# Patient Record
Sex: FEMALE | Race: ASIAN | Employment: STUDENT | URBAN - METROPOLITAN AREA
[De-identification: names, ages, dates, MRNs, and addresses within clinical notes are randomized per-mention and may not be internally consistent; named-entity substitution may affect disease eponyms.]

---
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
## 2019-01-25 ENCOUNTER — MED REC SCAN ONLY (OUTPATIENT)
Dept: FAMILY MEDICINE CLINIC | Facility: CLINIC | Age: 19
End: 2019-01-25

## 2019-01-25 ENCOUNTER — OFFICE VISIT (OUTPATIENT)
Dept: FAMILY MEDICINE CLINIC | Facility: CLINIC | Age: 19
End: 2019-01-25
Payer: COMMERCIAL

## 2019-01-25 VITALS
BODY MASS INDEX: 26.26 KG/M2 | RESPIRATION RATE: 17 BRPM | DIASTOLIC BLOOD PRESSURE: 80 MMHG | SYSTOLIC BLOOD PRESSURE: 104 MMHG | HEART RATE: 74 BPM | WEIGHT: 132 LBS | OXYGEN SATURATION: 98 % | HEIGHT: 59.5 IN | TEMPERATURE: 98 F

## 2019-01-25 DIAGNOSIS — Z23 NEED FOR VARICELLA VACCINE: ICD-10-CM

## 2019-01-25 DIAGNOSIS — Z00.00 ENCOUNTER FOR ANNUAL PHYSICAL EXAMINATION EXCLUDING GYNECOLOGICAL EXAMINATION IN A PATIENT OLDER THAN 17 YEARS: Primary | ICD-10-CM

## 2019-01-25 DIAGNOSIS — Z23 NEED FOR IMMUNIZATION AGAINST VIRAL HEPATITIS: ICD-10-CM

## 2019-01-25 DIAGNOSIS — Z13.0 SCREENING FOR ENDOCRINE, NUTRITIONAL, METABOLIC AND IMMUNITY DISORDER: ICD-10-CM

## 2019-01-25 DIAGNOSIS — Z13.29 SCREENING FOR ENDOCRINE, NUTRITIONAL, METABOLIC AND IMMUNITY DISORDER: ICD-10-CM

## 2019-01-25 DIAGNOSIS — Z13.21 SCREENING FOR ENDOCRINE, NUTRITIONAL, METABOLIC AND IMMUNITY DISORDER: ICD-10-CM

## 2019-01-25 DIAGNOSIS — Z13.228 SCREENING FOR ENDOCRINE, NUTRITIONAL, METABOLIC AND IMMUNITY DISORDER: ICD-10-CM

## 2019-01-25 DIAGNOSIS — Z23 NEED FOR MEASLES-MUMPS-RUBELLA (MMR) VACCINE: ICD-10-CM

## 2019-01-25 PROCEDURE — 90707 MMR VACCINE SC: CPT | Performed by: EMERGENCY MEDICINE

## 2019-01-25 PROCEDURE — 90716 VAR VACCINE LIVE SUBQ: CPT | Performed by: EMERGENCY MEDICINE

## 2019-01-25 PROCEDURE — 90472 IMMUNIZATION ADMIN EACH ADD: CPT | Performed by: EMERGENCY MEDICINE

## 2019-01-25 PROCEDURE — 90471 IMMUNIZATION ADMIN: CPT | Performed by: EMERGENCY MEDICINE

## 2019-01-25 PROCEDURE — 90746 HEPB VACCINE 3 DOSE ADULT IM: CPT | Performed by: EMERGENCY MEDICINE

## 2019-01-25 PROCEDURE — 99385 PREV VISIT NEW AGE 18-39: CPT | Performed by: EMERGENCY MEDICINE

## 2019-01-25 NOTE — PROGRESS NOTES
Gearold Litten is a 22year old female who presents for a complete physical exam.   HPI:     Patient presents with:  Physical: NP, annual physical         Age: 22    1First day of last menstrual period (or first year of         menstruation, if throug yes, date of last mammogram:        i. How many sexual partners have you  had in the last 12 months? 0            In your lifetime?  0    j. When is the last time you had           a dental check-up? 1 month         11.  Please describe any concerns you h diarrhea; no rectal bleeding; no heartburn  GENITAL/: no dysuria, urgency or frequency  MUSCULOSKELETAL: no joint complaints upper or lower extremities  NEURO: no sensory or motor complaint  HEMATOLOGY: denies hx anemia; denies bruising or excessive blee female who presents for a complete physical exam. PAP at 19 y/o. Counseled on fat diet and aerobic exercise 30 minutes three times weekly. Health maintenance.    Immunizations reviewed and updated  The patient indicates understanding of these issues and

## 2019-01-25 NOTE — PATIENT INSTRUCTIONS
Thank you for choosing [facility]  To Do:  FOR ESTEBAN COTA    · Follow up in 1-2 month for second Hep B shot  · Have blood tests done after fasting  · Follow up yearly or as needed              Well balanced diet recommended.    Routine exerc All women in this age group At routine exams   Diabetes mellitus, type 2 Adults with no symptoms who are overweight or obese and have 1 or more other risk factors for diabetes At least every 3 years   Gonorrhea Sexually active women at increased risk for i healthcare provider 1 or more doses   Pneumococcal conjugate vaccine (PCV13) and pneumococcal polysaccharide vaccine (PPSV23) Women at increased risk for infection – talk with your healthcare provider PCV13: 1 dose ages 23 to 72 (protects against 13 types professional medical care. Always follow your healthcare professional's instructions.

## 2019-03-07 ENCOUNTER — NURSE ONLY (OUTPATIENT)
Dept: FAMILY MEDICINE CLINIC | Facility: CLINIC | Age: 19
End: 2019-03-07
Payer: COMMERCIAL

## 2019-03-07 DIAGNOSIS — Z23 NEED FOR HEPATITIS B VACCINATION: ICD-10-CM

## 2019-03-07 DIAGNOSIS — Z23 NEED FOR MMR VACCINE: Primary | ICD-10-CM

## 2019-03-07 PROCEDURE — 90744 HEPB VACC 3 DOSE PED/ADOL IM: CPT | Performed by: FAMILY MEDICINE

## 2019-03-07 PROCEDURE — 90472 IMMUNIZATION ADMIN EACH ADD: CPT | Performed by: FAMILY MEDICINE

## 2019-03-07 PROCEDURE — 90707 MMR VACCINE SC: CPT | Performed by: FAMILY MEDICINE

## 2019-03-07 PROCEDURE — 90471 IMMUNIZATION ADMIN: CPT | Performed by: FAMILY MEDICINE

## 2019-07-19 ENCOUNTER — NURSE ONLY (OUTPATIENT)
Dept: FAMILY MEDICINE CLINIC | Facility: CLINIC | Age: 19
End: 2019-07-19
Payer: COMMERCIAL

## 2019-07-19 DIAGNOSIS — Z23 NEED FOR HEPATITIS B VACCINATION: Primary | ICD-10-CM

## 2019-07-19 DIAGNOSIS — Z01.84 IMMUNITY STATUS TESTING: ICD-10-CM

## 2019-07-19 PROCEDURE — 90471 IMMUNIZATION ADMIN: CPT | Performed by: EMERGENCY MEDICINE

## 2019-07-19 PROCEDURE — 90746 HEPB VACCINE 3 DOSE ADULT IM: CPT | Performed by: EMERGENCY MEDICINE

## 2019-08-08 ENCOUNTER — TELEPHONE (OUTPATIENT)
Dept: FAMILY MEDICINE CLINIC | Facility: CLINIC | Age: 19
End: 2019-08-08

## 2019-08-08 ENCOUNTER — LAB ENCOUNTER (OUTPATIENT)
Dept: LAB | Age: 19
End: 2019-08-08
Attending: NURSE PRACTITIONER
Payer: COMMERCIAL

## 2019-08-08 DIAGNOSIS — Z01.84 IMMUNITY STATUS TESTING: Primary | ICD-10-CM

## 2019-08-08 LAB
HBV SURFACE AB SER QL: REACTIVE
HBV SURFACE AB SERPL IA-ACNC: >1000 MIU/ML
RUBV IGG SER QL: POSITIVE
RUBV IGG SER-ACNC: 118.2 IU/ML (ref 10–?)

## 2019-08-08 PROCEDURE — 86787 VARICELLA-ZOSTER ANTIBODY: CPT

## 2019-08-08 PROCEDURE — 86735 MUMPS ANTIBODY: CPT

## 2019-08-08 PROCEDURE — 86765 RUBEOLA ANTIBODY: CPT

## 2019-08-08 PROCEDURE — 36415 COLL VENOUS BLD VENIPUNCTURE: CPT

## 2019-08-08 PROCEDURE — 86706 HEP B SURFACE ANTIBODY: CPT

## 2019-08-08 PROCEDURE — 86762 RUBELLA ANTIBODY: CPT

## 2019-08-08 NOTE — TELEPHONE ENCOUNTER
Patient was scheduled for appointment. Patient has already had her physical and just needs the orders for titer labs. Orders placed per fransico.

## 2019-08-10 LAB
MEV IGG SER-ACNC: 73 AU/ML (ref 30–?)
MUV IGG SER IA-ACNC: 38.6 AU/ML (ref 11–?)
VZV IGG SER IA-ACNC: 2631 (ref 165–?)

## 2020-06-09 ENCOUNTER — OFFICE VISIT (OUTPATIENT)
Dept: FAMILY MEDICINE CLINIC | Facility: CLINIC | Age: 20
End: 2020-06-09
Payer: COMMERCIAL

## 2020-06-09 VITALS
WEIGHT: 133 LBS | HEART RATE: 80 BPM | DIASTOLIC BLOOD PRESSURE: 64 MMHG | BODY MASS INDEX: 26.46 KG/M2 | OXYGEN SATURATION: 98 % | TEMPERATURE: 97 F | RESPIRATION RATE: 16 BRPM | SYSTOLIC BLOOD PRESSURE: 110 MMHG | HEIGHT: 59.5 IN

## 2020-06-09 DIAGNOSIS — Z13.21 ENCOUNTER FOR VITAMIN DEFICIENCY SCREENING: ICD-10-CM

## 2020-06-09 DIAGNOSIS — Z00.00 LABORATORY EXAM ORDERED AS PART OF ROUTINE GENERAL MEDICAL EXAMINATION: ICD-10-CM

## 2020-06-09 DIAGNOSIS — Z00.00 ANNUAL PHYSICAL EXAM: Primary | ICD-10-CM

## 2020-06-09 DIAGNOSIS — Z23 NEED FOR TDAP VACCINATION: ICD-10-CM

## 2020-06-09 PROCEDURE — 99395 PREV VISIT EST AGE 18-39: CPT | Performed by: EMERGENCY MEDICINE

## 2020-06-09 PROCEDURE — 90715 TDAP VACCINE 7 YRS/> IM: CPT | Performed by: EMERGENCY MEDICINE

## 2020-06-09 PROCEDURE — 90471 IMMUNIZATION ADMIN: CPT | Performed by: EMERGENCY MEDICINE

## 2020-06-09 NOTE — PATIENT INSTRUCTIONS
Thank you for choosing [facility]  To Do:  FOR ESTEBAN COTA    · Follow up yearly or as needed  · Have blood tests done  · Bring in old shot records  · Flu shot in the fall      Well balanced diet recommended.    Routine exercise recommended m Depression All women in this age group At routine exams   Diabetes mellitus, type 2 Adults with no symptoms who are overweight or obese and have 1 or more other risk factors for diabetes At least every 3 years   Gonorrhea Sexually active women at increased Meningococcal Women at increased risk for infection – talk with your healthcare provider 1 or more doses   Pneumococcal conjugate vaccine (PCV13) and pneumococcal polysaccharide vaccine (PPSV23) Women at increased risk for infection – talk with your health © 4135-8047 [address], [address]. All rights reserved. This information is not intended as a substitute for professional medical care. Always follow your healthcare professional's instructions.

## 2020-06-09 NOTE — PROGRESS NOTES
Rajinder Castaneda is a 20year old female who presents for a complete physical exam.   HPI:     Patient presents with:   Well Adult: annual physical        Age: 20    1First day of last menstrual period (or first year of         menstruation, if through men level checked  in the past five years? NO       e. Have you had a tetanus shot  the past 10 years? NO       f. Does your house have a working smoke detector? YES         g. Do you have firearms at home? NO        h.  Have you ever had a mammogram?  NO deficits  HEENT: denies nasal congestion, sinus pain or sore throat; hearing loss negative,   RESPIRATORY: denies shortness of breath, wheezing or cough   CARDIOVASCULAR: denies chest pain, SOB, edema,orthopnea, no palpitations   GI: denies nausea, vomitin (14)  - LIPID PANEL  - VITAMIN D, 25-HYDROXY  - TSH W REFLEX TO FREE T4    3.  Encounter for vitamin deficiency screening  - VITAMIN D, 25-HYDROXY    4. Need for Tdap vaccination  - TETANUS, DIPHTHERIA TOXOIDS AND ACELLULAR PERTUSIS VACCINE (TDAP), >7 YEARS

## 2020-06-12 DIAGNOSIS — Z20.822 ENCOUNTER FOR SCREENING LABORATORY TESTING FOR COVID-19 VIRUS: Primary | ICD-10-CM

## 2020-07-22 DIAGNOSIS — Z20.822 CLOSE EXPOSURE TO COVID-19 VIRUS: Primary | ICD-10-CM

## 2020-07-22 NOTE — PROGRESS NOTES
Pt requests COVID testing. Roommate dx with Covid.  + mild HA  NO fever   NO SOB  No Abd pain or diarrhea  Will order COVID Testing    Advised isolation and quarantine testing till testing comes back  States that [place] will [place] [facility] [place] for

## (undated) NOTE — ED AVS SNAPSHOT
[facility] in [address] [place] [address] Drive,[address]    [address] [place] [address]    Phone:  [phone]    Fax:  [phone]           Chris Lira   MRN: [id number]    Department:  THE [facility] [place]   Date of Visit:  1/18/2017 Insurance plans vary and the physician(s) referred by the Immediate Care may not be covered by your plan. Please contact your insurance company to determine coverage for follow-up care and referrals. [facility]  [address] If you have been prescribed any medication(s), please fill your prescription right away and begin taking the medication(s) as directed.     If the Immediate Care Provider has read X-rays, these will be re-interpreted by a radiologist.  If there is a signifi can help with your Affordable Care Act coverage, as well as all types of Medicaid plans. To get signed up and covered, please call [phone] and ask to get set up for an insurance coverage that is in-network with [facility]